# Patient Record
Sex: FEMALE | Race: BLACK OR AFRICAN AMERICAN | NOT HISPANIC OR LATINO | Employment: OTHER | URBAN - METROPOLITAN AREA
[De-identification: names, ages, dates, MRNs, and addresses within clinical notes are randomized per-mention and may not be internally consistent; named-entity substitution may affect disease eponyms.]

---

## 2018-04-30 ENCOUNTER — ESTABLISHED COMPREHENSIVE EXAM (OUTPATIENT)
Dept: URBAN - METROPOLITAN AREA CLINIC 39 | Facility: CLINIC | Age: 78
End: 2018-04-30

## 2018-04-30 DIAGNOSIS — H26.493: ICD-10-CM

## 2018-04-30 DIAGNOSIS — H43.813: ICD-10-CM

## 2018-04-30 DIAGNOSIS — H18.51: ICD-10-CM

## 2018-04-30 PROCEDURE — 1036F TOBACCO NON-USER: CPT

## 2018-04-30 PROCEDURE — G8756 NO BP MEASURE DOC: HCPCS

## 2018-04-30 PROCEDURE — 92014 COMPRE OPH EXAM EST PT 1/>: CPT

## 2018-04-30 PROCEDURE — 92134 CPTRZ OPH DX IMG PST SGM RTA: CPT

## 2018-04-30 PROCEDURE — 92015 DETERMINE REFRACTIVE STATE: CPT

## 2018-04-30 PROCEDURE — G8427 DOCREV CUR MEDS BY ELIG CLIN: HCPCS

## 2018-04-30 ASSESSMENT — VISUAL ACUITY
OS_SC: 20/30-1
OS_CC: J1+
OD_SC: J2
OD_CC: 20/25
OD_SC: 20/25-2
OS_SC: J1+
OS_CC: 20/20-2
OD_CC: J1+

## 2018-04-30 ASSESSMENT — TONOMETRY
OD_IOP_MMHG: 15
OS_IOP_MMHG: 15

## 2019-06-03 ENCOUNTER — ESTABLISHED COMPREHENSIVE EXAM (OUTPATIENT)
Dept: URBAN - METROPOLITAN AREA CLINIC 39 | Facility: CLINIC | Age: 79
End: 2019-06-03

## 2019-06-03 DIAGNOSIS — Z96.1: ICD-10-CM

## 2019-06-03 DIAGNOSIS — H43.813: ICD-10-CM

## 2019-06-03 DIAGNOSIS — H26.493: ICD-10-CM

## 2019-06-03 PROCEDURE — 92134 CPTRZ OPH DX IMG PST SGM RTA: CPT

## 2019-06-03 PROCEDURE — 99214 OFFICE O/P EST MOD 30 MIN: CPT

## 2019-06-03 PROCEDURE — 92015 DETERMINE REFRACTIVE STATE: CPT

## 2019-06-03 ASSESSMENT — VISUAL ACUITY
OD_SC: J6
OS_SC: J1
OS_BAT: 20/20-1
OD_BAT: 20/30-2
OD_CC: J1-
OD_SC: 20/40-1
OS_CC: J1
OS_CC: 20/20
OS_SC: 20/30-1
OD_CC: 20/25-2

## 2019-06-03 ASSESSMENT — TONOMETRY
OD_IOP_MMHG: 15
OS_IOP_MMHG: 14

## 2020-01-07 NOTE — PATIENT DISCUSSION
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 NO CNV SEEN TODAY. Dry ARMD is responsible for some decrease in vision. Nicotine patch 21 mg daily  Smoking Cessation advised  Smoking Education provided

## 2020-08-10 ENCOUNTER — ESTABLISHED COMPREHENSIVE EXAM (OUTPATIENT)
Dept: URBAN - METROPOLITAN AREA CLINIC 39 | Facility: CLINIC | Age: 80
End: 2020-08-10

## 2020-08-10 DIAGNOSIS — H43.813: ICD-10-CM

## 2020-08-10 DIAGNOSIS — H04.123: ICD-10-CM

## 2020-08-10 DIAGNOSIS — H18.51: ICD-10-CM

## 2020-08-10 DIAGNOSIS — H26.493: ICD-10-CM

## 2020-08-10 DIAGNOSIS — H35.363: ICD-10-CM

## 2020-08-10 DIAGNOSIS — Z96.1: ICD-10-CM

## 2020-08-10 PROCEDURE — 99213 OFFICE O/P EST LOW 20 MIN: CPT

## 2020-08-10 PROCEDURE — 92134 CPTRZ OPH DX IMG PST SGM RTA: CPT

## 2020-08-10 PROCEDURE — 92015 DETERMINE REFRACTIVE STATE: CPT

## 2020-08-10 ASSESSMENT — VISUAL ACUITY
OS_SC: J1
OD_SC: J3-
OD_CC: 20/30+1
OD_SC: 20/40+1
OS_SC: 20/30-1
OS_CC: 20/20

## 2020-08-10 ASSESSMENT — TONOMETRY
OD_IOP_MMHG: 13
OS_IOP_MMHG: 12

## 2021-01-14 NOTE — PATIENT DISCUSSION
ATYPICAL - WENT CLOUDY - TO GET W/U IN ABUNDANCE OF CAUTION - ? 2ND ARLENE - DOES NOT SOUND LIKE OCULAR MIGRAINE.

## 2021-01-22 NOTE — PATIENT DISCUSSION
CAROTID 1 21 21 SHOWS <50% STENOSIS BILATERAL.  LEFT CAROTID EXAM SHOWS FOCAL THYROID LESION LOCATED AT LEFT THYROID LOBE MEASURING 1.5 X 1.0 X 0.8CM - WILL REFER TO PCP FOR ADDITIONAL RECOMMENDED TESTING AND FOLLOW UP.

## 2024-07-03 ENCOUNTER — NEW PATIENT (OUTPATIENT)
Dept: URBAN - METROPOLITAN AREA CLINIC 35 | Facility: CLINIC | Age: 84
End: 2024-07-03

## 2024-07-03 DIAGNOSIS — H26.493: ICD-10-CM

## 2024-07-03 DIAGNOSIS — H52.7: ICD-10-CM

## 2024-07-03 DIAGNOSIS — H40.013: ICD-10-CM

## 2024-07-03 DIAGNOSIS — H43.813: ICD-10-CM

## 2024-07-03 DIAGNOSIS — H04.123: ICD-10-CM

## 2024-07-03 DIAGNOSIS — H35.363: ICD-10-CM

## 2024-07-03 PROCEDURE — 92015 DETERMINE REFRACTIVE STATE: CPT

## 2024-07-03 PROCEDURE — 92004 COMPRE OPH EXAM NEW PT 1/>: CPT

## 2024-07-03 ASSESSMENT — TONOMETRY
OS_IOP_MMHG: 15
OD_IOP_MMHG: 15

## 2024-07-03 ASSESSMENT — VISUAL ACUITY
OU_SC: 20/25
OU_SC: J1
OD_SC: 20/30-2
OS_SC: 20/30
OD_SC: J5-1
OS_SC: J1

## 2024-09-06 ENCOUNTER — FOLLOW UP (OUTPATIENT)
Dept: URBAN - METROPOLITAN AREA CLINIC 35 | Facility: CLINIC | Age: 84
End: 2024-09-06

## 2024-09-06 DIAGNOSIS — H40.013: ICD-10-CM

## 2024-09-06 PROCEDURE — 92133 CPTRZD OPH DX IMG PST SGM ON: CPT

## 2024-09-06 PROCEDURE — 76514 ECHO EXAM OF EYE THICKNESS: CPT

## 2024-09-06 PROCEDURE — 99213 OFFICE O/P EST LOW 20 MIN: CPT
